# Patient Record
Sex: FEMALE | Race: WHITE
[De-identification: names, ages, dates, MRNs, and addresses within clinical notes are randomized per-mention and may not be internally consistent; named-entity substitution may affect disease eponyms.]

---

## 2018-03-06 ENCOUNTER — HOSPITAL ENCOUNTER (OUTPATIENT)
Dept: HOSPITAL 47 - MMGSC | Age: 38
Discharge: HOME | End: 2018-03-06
Payer: MEDICAID

## 2018-03-06 DIAGNOSIS — R10.9: ICD-10-CM

## 2018-03-06 DIAGNOSIS — R53.83: ICD-10-CM

## 2018-03-06 DIAGNOSIS — N92.6: Primary | ICD-10-CM

## 2018-03-06 LAB
ALBUMIN SERPL-MCNC: 4.2 G/DL (ref 3.5–5)
ALP SERPL-CCNC: 43 U/L (ref 38–126)
ALT SERPL-CCNC: 23 U/L (ref 9–52)
AMYLASE SERPL-CCNC: 57 U/L (ref 30–110)
ANION GAP SERPL CALC-SCNC: 7 MMOL/L
AST SERPL-CCNC: 16 U/L (ref 14–36)
BASOPHILS # BLD AUTO: 0 K/UL (ref 0–0.2)
BASOPHILS NFR BLD AUTO: 1 %
BUN SERPL-SCNC: 10 MG/DL (ref 7–17)
CALCIUM SPEC-MCNC: 9.8 MG/DL (ref 8.4–10.2)
CHLORIDE SERPL-SCNC: 104 MMOL/L (ref 98–107)
CHOLEST SERPL-MCNC: 162 MG/DL (ref ?–200)
CO2 SERPL-SCNC: 30 MMOL/L (ref 22–30)
EOSINOPHIL # BLD AUTO: 0.3 K/UL (ref 0–0.7)
EOSINOPHIL NFR BLD AUTO: 6 %
ERYTHROCYTE [DISTWIDTH] IN BLOOD BY AUTOMATED COUNT: 4.32 M/UL (ref 3.8–5.4)
ERYTHROCYTE [DISTWIDTH] IN BLOOD: 13.1 % (ref 11.5–15.5)
GLUCOSE SERPL-MCNC: 85 MG/DL (ref 74–99)
HCT VFR BLD AUTO: 39.4 % (ref 34–46)
HDLC SERPL-MCNC: 67 MG/DL (ref 40–60)
HGB BLD-MCNC: 12.4 GM/DL (ref 11.4–16)
LDLC SERPL CALC-MCNC: 87 MG/DL (ref 0–99)
LIPASE SERPL-CCNC: 81 U/L (ref 23–300)
LYMPHOCYTES # SPEC AUTO: 1.3 K/UL (ref 1–4.8)
LYMPHOCYTES NFR SPEC AUTO: 25 %
MCH RBC QN AUTO: 28.8 PG (ref 25–35)
MCHC RBC AUTO-ENTMCNC: 31.6 G/DL (ref 31–37)
MCV RBC AUTO: 91.2 FL (ref 80–100)
MONOCYTES # BLD AUTO: 0.3 K/UL (ref 0–1)
MONOCYTES NFR BLD AUTO: 6 %
NEUTROPHILS # BLD AUTO: 3.1 K/UL (ref 1.3–7.7)
NEUTROPHILS NFR BLD AUTO: 61 %
PLATELET # BLD AUTO: 293 K/UL (ref 150–450)
POTASSIUM SERPL-SCNC: 4.2 MMOL/L (ref 3.5–5.1)
PROT SERPL-MCNC: 7 G/DL (ref 6.3–8.2)
SODIUM SERPL-SCNC: 141 MMOL/L (ref 137–145)
TRIGL SERPL-MCNC: 38 MG/DL (ref ?–150)
WBC # BLD AUTO: 5 K/UL (ref 3.8–10.6)

## 2018-03-06 PROCEDURE — 80061 LIPID PANEL: CPT

## 2018-03-06 PROCEDURE — 82150 ASSAY OF AMYLASE: CPT

## 2018-03-06 PROCEDURE — 80053 COMPREHEN METABOLIC PANEL: CPT

## 2018-03-06 PROCEDURE — 85025 COMPLETE CBC W/AUTO DIFF WBC: CPT

## 2018-03-06 PROCEDURE — 86255 FLUORESCENT ANTIBODY SCREEN: CPT

## 2018-03-06 PROCEDURE — 83516 IMMUNOASSAY NONANTIBODY: CPT

## 2018-03-06 PROCEDURE — 82306 VITAMIN D 25 HYDROXY: CPT

## 2018-03-06 PROCEDURE — 36415 COLL VENOUS BLD VENIPUNCTURE: CPT

## 2018-03-06 PROCEDURE — 83690 ASSAY OF LIPASE: CPT

## 2018-03-06 PROCEDURE — 84443 ASSAY THYROID STIM HORMONE: CPT

## 2018-03-07 LAB — GLIADIN IGA SER-ACNC: <0.2 U/ML

## 2018-06-25 ENCOUNTER — HOSPITAL ENCOUNTER (OUTPATIENT)
Dept: HOSPITAL 47 - LABPAT | Age: 38
Discharge: HOME | End: 2018-06-25
Payer: MEDICAID

## 2018-06-25 DIAGNOSIS — Z01.812: Primary | ICD-10-CM

## 2018-06-25 LAB
BASOPHILS # BLD AUTO: 0 K/UL (ref 0–0.2)
BASOPHILS NFR BLD AUTO: 1 %
EOSINOPHIL # BLD AUTO: 0.4 K/UL (ref 0–0.7)
EOSINOPHIL NFR BLD AUTO: 6 %
ERYTHROCYTE [DISTWIDTH] IN BLOOD BY AUTOMATED COUNT: 4.39 M/UL (ref 3.8–5.4)
ERYTHROCYTE [DISTWIDTH] IN BLOOD: 13.6 % (ref 11.5–15.5)
HCT VFR BLD AUTO: 39.4 % (ref 34–46)
HGB BLD-MCNC: 13.1 GM/DL (ref 11.4–16)
LYMPHOCYTES # SPEC AUTO: 1.4 K/UL (ref 1–4.8)
LYMPHOCYTES NFR SPEC AUTO: 25 %
MCH RBC QN AUTO: 29.7 PG (ref 25–35)
MCHC RBC AUTO-ENTMCNC: 33.1 G/DL (ref 31–37)
MCV RBC AUTO: 89.8 FL (ref 80–100)
MONOCYTES # BLD AUTO: 0.3 K/UL (ref 0–1)
MONOCYTES NFR BLD AUTO: 5 %
NEUTROPHILS # BLD AUTO: 3.4 K/UL (ref 1.3–7.7)
NEUTROPHILS NFR BLD AUTO: 62 %
PLATELET # BLD AUTO: 254 K/UL (ref 150–450)
WBC # BLD AUTO: 5.5 K/UL (ref 3.8–10.6)

## 2018-06-25 PROCEDURE — 85025 COMPLETE CBC W/AUTO DIFF WBC: CPT

## 2018-06-25 PROCEDURE — 36415 COLL VENOUS BLD VENIPUNCTURE: CPT

## 2018-06-29 NOTE — P.HPOB
History of Present Illness


H&P Date: 18


Chief Complaint: Menorrhagia.





This patient is a pleasant 38-year-old  3 para 3 female who is 

presenting for endometrial ablation secondary to persistent menorrhagia and 

dysfunctional uterine bleeding.  Patient has had a tubal ligation and has tried 

oral contraceptives for menstrual control however they are persistently long 

and heavy.  Pelvic ultrasound was unremarkable.  Patient is now requesting 

endometrial ablation for treatment.





Review of Systems


Genitourinary: Reports as per HPI, Reports abnormal vaginal bleeding


Menstruation: Reports period heavy





Past Medical History


Past Medical History: No Reported History


Additional Past Medical History / Comment(s): heavy vaginally bleeding and 

inconsistent periods


History of Any Multi-Drug Resistant Organisms: None Reported


Past Surgical History:  Section, Cholecystectomy, Tubal Ligation


Additional Past Surgical History / Comment(s): rt breast lumpectomy-benign, 

cyst removed lt ovary,.  3 c/s.


Past Anesthesia/Blood Transfusion Reactions: No Reported Reaction


Past Psychological History: No Psychological Hx Reported


Smoking Status: Never smoker


Past Alcohol Use History: None Reported


Past Drug Use History: None Reported





- Past Family History


  ** Mother


Family Medical History: Cancer


Additional Family Medical History / Comment(s): pancreatic cancer





Medications and Allergies


 Home Medications











 Medication  Instructions  Recorded  Confirmed  Type


 


Cyanocobalamin (Vitamin B-12) 1,000 mcg PO DAILY 18 History





[Vitamin B-12]    


 


Multivitamins, Thera [Multivitamin 1 tab PO DAILY 18 History





(formulary)]    











 Allergies











Allergy/AdvReac Type Severity Reaction Status Date / Time


 


Penicillins Allergy  Rash/Hives Verified 18 09:37














Exam





- OBG Physical Exam


Abdomen: bowel sounds normal, no diffuse tenderness, no bruit present, no 

guarding noted, no hepatomegaly, no splenomegaly, no mass


Vulva: both: normal


Vagina: normal moisture, no discharge


Cervix: no lesion, no discharge


Uterus: normal size, normal contour





Results





Previous pelvic ultrasound was unremarkable.





Assessment and Plan


Assessment: 





This patient is a pleasant 38-year-old  3 para 3 female with long-

standing menorrhagia and dysfunctional bleeding requesting NovaSure endometrial 

ablation for treatment.  Plan is hysteroscopy, D&C, and NovaSure endometrial 

ablation.  Patient understands this surgery and risks including risks of 

infection, bleeding, possible uterine perforation, and/or thermal injury.  All 

the patient's questions are answered and a written consent is obtained.


(1) Menorrhagia


Status: Chronic   Code(s): N92.0 - EXCESSIVE AND FREQUENT MENSTRUATION WITH 

REGULAR CYCLE   SNOMED Code(s): 163956198

## 2018-07-02 ENCOUNTER — HOSPITAL ENCOUNTER (OUTPATIENT)
Dept: HOSPITAL 47 - OR | Age: 38
Discharge: HOME | End: 2018-07-02
Payer: MEDICAID

## 2018-07-02 VITALS — RESPIRATION RATE: 18 BRPM

## 2018-07-02 VITALS — DIASTOLIC BLOOD PRESSURE: 66 MMHG | HEART RATE: 66 BPM | SYSTOLIC BLOOD PRESSURE: 104 MMHG

## 2018-07-02 VITALS — TEMPERATURE: 97.7 F

## 2018-07-02 VITALS — BODY MASS INDEX: 25.4 KG/M2

## 2018-07-02 DIAGNOSIS — Z98.51: ICD-10-CM

## 2018-07-02 DIAGNOSIS — Z88.0: ICD-10-CM

## 2018-07-02 DIAGNOSIS — N92.0: Primary | ICD-10-CM

## 2018-07-02 DIAGNOSIS — N93.8: ICD-10-CM

## 2018-07-02 PROCEDURE — 58563 HYSTEROSCOPY ABLATION: CPT

## 2018-07-02 PROCEDURE — 81025 URINE PREGNANCY TEST: CPT

## 2018-07-02 PROCEDURE — 88305 TISSUE EXAM BY PATHOLOGIST: CPT

## 2018-07-02 NOTE — P.OP
Date of Procedure: 07/02/18


Preoperative Diagnosis: 


Menorrhagia and dysfunctional bleeding.


Postoperative Diagnosis: 


Same


Procedure(s) Performed: 


#1:hysteroscopy.  #2: Dilation and curettage.  #3: NovaSure endometrial 

ablation.


Anesthesia: MAC


Surgeon: Adis Wilburn


Estimated Blood Loss (ml): 5


Urine output (ml): 75


Pathology: other (Uterine curettings)


Condition: stable


Disposition: PACU


Indications for Procedure: 


Please see dictated H&P for intimate details of this patient's admission.  

Brief summary this is a pleasant 38-year-old multiparous patient is presenting 

for hysteroscopy D&C and NovaSure endometrial ablation for refractory 

dysfunctional bleeding patient understands this surgery and risks including 

risks of infection, bleeding, possible uterine perforation, and/or thermal 

injury.  All the patient's questions are answered and a written consent is 

obtained.


Operative Findings: 


This patient had a normal-appearing endometrial cavity.


Description of Procedure: 


This patient is taken to the operating room where she is laid in the supine 

position.  She subsequently undergoes mask/LMA anesthesia without incident.  

With an adequate level of anesthesia she's placed in dorsal lithotomy position.

  She has a vaginal and perineal prep and drape.  Examination anesthesia shows 

a retroverted uterus of normal size.  I then drained the bladder for 75 mL of 

clear urine.  A weighted speculum was placed in the posterior vagina.  The 

anterior lip of the cervix is then grasped with an Allis clamp.  I gently sound 

the uterus to 8 cm.  Serial dilation is then done to allow the hysteroscope 

into the uterine cavity.  Hysteroscopy is performed with saline solution the 

cavity appears normal measured to a length of 5.5 cm.  With this done the 

hysteroscope was then removed.  Cervix is dilated more to allow a sharp curette 

easily and the uterine cavity a gentle but thorough 4 quadrant curettage is 

then done.  This tissue is sent off to pathology.  NovaSure device is then 

opened it appears to be intact.  Cervix is dilated more to allow the NovaSure 

device easily and the uterine cavity.  It is set at a length of 5.5 cm and 

opens up to a width of 4.1 cm.  It is seated in place and then passes the 

cavity integrity test.  It is then enabled at 124 W setting for 67 seconds.  

NovaSure device is then removed and appears to be intact.  The Allis clamp and 

weighted speculum removed and there is minimal bleeding.  The case is then 

ended.  All counts correct 3.  There are no complications.  Patient is taken 

the recovery room in satisfactory condition.